# Patient Record
(demographics unavailable — no encounter records)

---

## 2024-11-08 NOTE — HISTORY OF PRESENT ILLNESS
[FreeTextEntry1] : 63 yo M presents for follow up.  PMH: arthritis, spinal stenosis, MANFRED, BPH  PSH: ACL repair, Meniscus repair, Spinal sx, kidney cyst removal.   Denies family hx of CRC/IBD  Medications: Flomax, Omeprazole, Rosuvastatin, Tadalafil, Iron supplement Allergies: NKDA Last Colonoscopy: 10/2022 and unremarkable as per patient  Initially seen 1/31/20 for hemorrhoidal pain.  s/p RBL of LL hemorrhoid  Seen again 4/9/2021, c/o BRPPR with almost every BM as well as itching and occasional pain.  Exam showed spontaneously reducible (grade II) LL internal hemorrhoid.  s/p RBL done to LL and RPQ.   Last seen on 5/1/2023 for BRBPR. Occurs intermittently but when it does occur it lasts for 2 weeks. Unable to identify triggers.  Pt reported that his Hgb went down to 8.0 in 9/2022 and was resolved with iron supplementation as advised by his PCP. He belies the anemia was 2/2 rectal bleeding. Exam noted a spontaneously reducible (grade II) internal hemorrhoid. left lateral. External hemorrhoid. residual hemorrhoidal skin tags were noted. Small left lateral external hemorrhoid. s/p RBL left lateral If bleeding is persistent options including recurrent rubber band ligation versus single quadrant excisional hemorrhoidectomy may be considered.  Patient presents today for follow up.  Reports about three weeks ago developed bright red rectal bleeding seen on toilet paper and on toilet bowl every time he sits on the toilet bowl. States bleeding decreased within the week but reappeared again three days ago.  Denies feeling a lump, itchiness, pain.  Denies using any over the counter treatments, Sitz bath   Has been feeling fatigued and went to see his PCP two weeks ago.  Reports blood work drawn. Pending results.   Bowel movement is usually once a day. Stool is formed. Denies constipation.  Denies taking fiber supplement, stool softener.   Denies taking NSAIDS/blood thinner.

## 2024-11-08 NOTE — PHYSICAL EXAM
[Excoriation] : no perianal excoriation [Reduce Spontaneously] : a spontaneously reducible (grade II) [Skin Tags] : residual hemorrhoidal skin tags were noted [Normal] : was normal [None] : there was no rectal mass  [de-identified] : left lateral  [de-identified] : Small left lateral external hemorrhoid [FreeTextEntry1] : A lighted anoscope was passed into the anal canal and the entire anal mucosal surface was inspected..  The findings revealed moderate-large left lateral internal hemorrhoids. No masses or lesions were identified.   The risks and benefits of rubber band ligation were discussed with the patient including but not limited to bleeding, pain, infection, and the need for future procedures. The anoscope was placed and rubber band ligation was performed of the internal hemorrhoids - Left lateral with good result. The patient tolerated the procedure well. Appropriate postprocedure instructions were given to the patient.

## 2024-11-08 NOTE — ASSESSMENT
[FreeTextEntry1] : I had a detailed discussion with the patient regarding optimization of bowel movements with increasing daily fiber and water intake. Both synthetic and dietary fiber recommendations were reviewed. I had strongly counseled the patient regarding the need for increasing water to help improve  bowel function.  I discussed with the patient that improving  bowel function will alleviate  hemorrhoidal symptoms.  Advised return in 4-6 weeks if symptoms are persistent.  I reviewed with the patient that given the large size of his left lateral internal hemorrhoid further rubber band ligation would be appropriate if symptoms are recurrent.

## 2024-11-08 NOTE — PHYSICAL EXAM
[Excoriation] : no perianal excoriation [Reduce Spontaneously] : a spontaneously reducible (grade II) [Skin Tags] : residual hemorrhoidal skin tags were noted [Normal] : was normal [None] : there was no rectal mass  [de-identified] : left lateral  [de-identified] : Small left lateral external hemorrhoid [FreeTextEntry1] : A lighted anoscope was passed into the anal canal and the entire anal mucosal surface was inspected..  The findings revealed moderate-large left lateral internal hemorrhoids. No masses or lesions were identified.   The risks and benefits of rubber band ligation were discussed with the patient including but not limited to bleeding, pain, infection, and the need for future procedures. The anoscope was placed and rubber band ligation was performed of the internal hemorrhoids - Left lateral with good result. The patient tolerated the procedure well. Appropriate postprocedure instructions were given to the patient.

## 2025-05-30 NOTE — ASSESSMENT
[FreeTextEntry1] : Recommend CT scan for evaluation of intermittent abdominal pain.  Advised follow-up with GI for pill study given history of anemia of preceding his current rectal bleeding.  I had a detailed discussion with the patient regarding optimization of bowel movements with increasing daily fiber and water intake. Both synthetic and dietary fiber recommendations were reviewed. I had strongly counseled the patient regarding the need for increasing water to help improve  bowel function.  I discussed with the patient that improving  bowel function will alleviate  hemorrhoidal symptoms.  Advised return in 4-6 weeks if symptoms are persistent.

## 2025-05-30 NOTE — HISTORY OF PRESENT ILLNESS
[FreeTextEntry1] : 63 y/o M presents for follow up of hemorrhoids   PMH: arthritis, spinal stenosis, MANFRED, BPH PSH: ACL repair, Meniscus repair, Spinal sx, kidney cyst removal. Denies family hx of CRC/IBD Medications: Flomax, Omeprazole, Rosuvastatin, Tadalafil, Iron supplement Allergies: NKDA Last Colonoscopy: 10/2022 and unremarkable as per patient  Initially seen 1/31/20 for hemorrhoidal pain. s/p RBL of LL hemorrhoid  Seen again 4/9/2021, c/o BRPPR with almost every BM as well as itching and occasional pain. Exam showed spontaneously reducible (grade II) LL internal hemorrhoid. s/p RBL done to LL and RPQ.  Last seen on 5/1/2023 for BRBPR. Occurs intermittently but when it does occur it lasts for 2 weeks. Unable to identify triggers. Pt reported that his Hgb went down to 8.0 in 9/2022 and was resolved with iron supplementation as advised by his PCP. He belies the anemia was 2/2 rectal bleeding. Exam noted a spontaneously reducible (grade II) internal hemorrhoid. left lateral. External hemorrhoid. residual hemorrhoidal skin tags were noted. Small left lateral external hemorrhoid. s/p RBL left lateral  Most recent office visit 11/8/24, patient reports he developed bright red rectal bleeding seen on toilet paper and toilet bowl every time he sits. Bleeding decreased within last week but reappeared three days ago. BMs usually once a day.   On exam, reducible grade II internal hemorrhoid (LL) position. External hemorrhoid, with residual hemorrhoidal skin tags appreciated. Moderate- large left lateral internal hemorrhoids.  S/p RBL of LL internal hemorrhoids  Patient returns today in f/u. He did well following the banding, no bleeding. He happened to have blood work recently which was notable for hgb 10, ferritin 5. Subsequently started on IV Iron BID (trial x 1 month with heme) and underwent colonoscopy on 5/20 with Dr. Khang Morfin- few polyps, diverticulosis and small internal hemorrhoids as per patient.  Since the colonoscopy, he has been having rectal bleeding with every bowel movement. Describes blood as shooting into bowl with a strong stream of blood.  He is concerned about the bleeding and interested in exam and further treatment options.  Patient reports intermittent abdominal pain.  Lasting for several hours -vague diffuse pain.

## 2025-05-30 NOTE — PHYSICAL EXAM
[Abdomen Masses] : No abdominal masses [Abdomen Tenderness] : ~T No ~M abdominal tenderness [No HSM] : no hepatosplenomegaly [Excoriation] : no perianal excoriation [Reduce Spontaneously] : a spontaneously reducible (grade II) [Skin Tags] : residual hemorrhoidal skin tags were noted [Normal] : was normal [None] : there was no rectal mass  [de-identified] : left lateral  [de-identified] : Small left lateral external hemorrhoid [FreeTextEntry1] : A lighted anoscope was passed into the anal canal and the entire anal mucosal surface was inspected..  The findings revealed moderate-large left lateral internal hemorrhoids. No masses or lesions were identified.   The risks and benefits of rubber band ligation were discussed with the patient including but not limited to bleeding, pain, infection, and the need for future procedures. The anoscope was placed and rubber band ligation was performed of the internal hemorrhoids - Left lateral and right anterior with good result. The patient tolerated the procedure well. Appropriate postprocedure instructions were given to the patient.